# Patient Record
Sex: FEMALE | Race: WHITE | URBAN - METROPOLITAN AREA
[De-identification: names, ages, dates, MRNs, and addresses within clinical notes are randomized per-mention and may not be internally consistent; named-entity substitution may affect disease eponyms.]

---

## 2024-03-27 ENCOUNTER — HOSPITAL ENCOUNTER (OUTPATIENT)
Dept: ULTRASOUND IMAGING | Age: 29
Discharge: HOME | End: 2024-03-27
Attending: INTERNAL MEDICINE
Payer: COMMERCIAL

## 2024-03-27 DIAGNOSIS — R79.89 ELEVATED LFTS: ICD-10-CM

## 2024-03-27 PROCEDURE — 76705 ECHO EXAM OF ABDOMEN: CPT

## 2024-03-27 NOTE — RESULT ENCOUNTER NOTE
RUQ US - fatty infiltration, NAFLD likely cause of LFT elevations, start mediterranean diet and increase exercise, can consider GLP-1 at future appt, repeat liver panel 1yr